# Patient Record
Sex: FEMALE | URBAN - METROPOLITAN AREA
[De-identification: names, ages, dates, MRNs, and addresses within clinical notes are randomized per-mention and may not be internally consistent; named-entity substitution may affect disease eponyms.]

---

## 2024-09-11 ENCOUNTER — OFFICE VISIT (OUTPATIENT)
Dept: FAMILY MEDICINE CLINIC | Facility: CLINIC | Age: 24
End: 2024-09-11

## 2024-09-11 VITALS
SYSTOLIC BLOOD PRESSURE: 116 MMHG | DIASTOLIC BLOOD PRESSURE: 75 MMHG | HEIGHT: 63 IN | BODY MASS INDEX: 24.8 KG/M2 | HEART RATE: 89 BPM | WEIGHT: 140 LBS

## 2024-09-11 DIAGNOSIS — Z23 ENCOUNTER FOR ADMINISTRATION OF VACCINE: ICD-10-CM

## 2024-09-11 DIAGNOSIS — Z12.4 SCREENING FOR MALIGNANT NEOPLASM OF CERVIX: ICD-10-CM

## 2024-09-11 DIAGNOSIS — Z30.09 COUNSELING FOR BIRTH CONTROL REGARDING INTRAUTERINE DEVICE (IUD): ICD-10-CM

## 2024-09-11 DIAGNOSIS — Z00.00 WELL ADULT EXAM: Primary | ICD-10-CM

## 2024-09-11 DIAGNOSIS — Z11.3 SCREENING EXAMINATION FOR STD (SEXUALLY TRANSMITTED DISEASE): ICD-10-CM

## 2024-09-11 PROCEDURE — 99385 PREV VISIT NEW AGE 18-39: CPT | Performed by: NURSE PRACTITIONER

## 2024-09-11 PROCEDURE — 90651 9VHPV VACCINE 2/3 DOSE IM: CPT | Performed by: NURSE PRACTITIONER

## 2024-09-11 PROCEDURE — 3074F SYST BP LT 130 MM HG: CPT | Performed by: NURSE PRACTITIONER

## 2024-09-11 PROCEDURE — 90471 IMMUNIZATION ADMIN: CPT | Performed by: NURSE PRACTITIONER

## 2024-09-11 PROCEDURE — 3078F DIAST BP <80 MM HG: CPT | Performed by: NURSE PRACTITIONER

## 2024-09-11 PROCEDURE — 3008F BODY MASS INDEX DOCD: CPT | Performed by: NURSE PRACTITIONER

## 2024-09-11 NOTE — PROGRESS NOTES
HPI    Patient presents for annual physical.  Negative for past medical history.  Patient with history of abnormal bruising while previously on OCPs.  Would like to refer to gynecology to discuss potentially having an IUD placed.  Does have some scattered varicose veins that are small to right upper thigh.  Otherwise, no history of abnormal bleeding.    Gyne history - G0.  LMP - 8/17/2024.  Birth control - interested in IUD.    Diet - does not eat healthy.    Exercise - does not exercise regularly.    Immunizations - gardasil today.      Review of Systems   All other systems reviewed and are negative.       Vitals:    09/11/24 1504   BP: 116/75   Pulse: 89   Weight: 140 lb (63.5 kg)   Height: 5' 3\" (1.6 m)     Body mass index is 24.8 kg/m².    Health Maintenance   Topic Date Due    HPV Vaccines (1 - 3-dose series) Never done    DTaP,Tdap,and Td Vaccines (1 - Tdap) Never done    Pap Smear  Never done    COVID-19 Vaccine (1 - 2023-24 season) Never done    Annual Depression Screening  09/11/2025 (Originally 1/1/2024)    Influenza Vaccine (1) 10/01/2024    Annual Physical  09/11/2025    Pneumococcal Vaccine: Birth to 64yrs  Aged Out       Patient's last menstrual period was 08/17/2024.    Past Medical History:    Asthma (HCC)       .  Past Surgical History:   Procedure Laterality Date    Appendectomy         Family History   Problem Relation Age of Onset    Diabetes Father        Social History     Socioeconomic History    Marital status: Single     Spouse name: Not on file    Number of children: Not on file    Years of education: Not on file    Highest education level: Not on file   Occupational History    Not on file   Tobacco Use    Smoking status: Never    Smokeless tobacco: Never   Vaping Use    Vaping status: Never Used   Substance and Sexual Activity    Alcohol use: Yes     Comment: Social    Drug use: Never    Sexual activity: Not on file   Other Topics Concern    Not on file   Social History Narrative    Not on  file     Social Determinants of Health     Financial Resource Strain: Not on file   Food Insecurity: Not on file   Transportation Needs: Not on file   Physical Activity: Not on file   Stress: Not on file   Social Connections: Not on file   Housing Stability: Not on file       No current outpatient medications on file.       Allergies:  No Known Allergies    Physical Exam  Vitals and nursing note reviewed.   Constitutional:       General: She is not in acute distress.     Appearance: Normal appearance. She is well-developed. She is not ill-appearing, toxic-appearing or diaphoretic.   HENT:      Head: Normocephalic and atraumatic.      Right Ear: Hearing, tympanic membrane, ear canal and external ear normal. There is no impacted cerumen.      Left Ear: Hearing, tympanic membrane, ear canal and external ear normal. There is no impacted cerumen.      Nose: Nose normal. No congestion.      Mouth/Throat:      Mouth: Mucous membranes are moist.      Pharynx: Oropharynx is clear. No oropharyngeal exudate or posterior oropharyngeal erythema.   Eyes:      General:         Right eye: No discharge.         Left eye: No discharge.      Conjunctiva/sclera: Conjunctivae normal.   Neck:      Thyroid: No thyromegaly.   Cardiovascular:      Rate and Rhythm: Normal rate and regular rhythm.      Pulses: Normal pulses.      Heart sounds: Normal heart sounds. No murmur heard.  Pulmonary:      Effort: Pulmonary effort is normal. No respiratory distress.      Breath sounds: Normal breath sounds. No stridor. No wheezing, rhonchi or rales.   Chest:      Chest wall: No tenderness.   Breasts:     Right: Normal.      Left: Normal.   Abdominal:      General: Abdomen is flat. Bowel sounds are normal. There is no distension.      Palpations: Abdomen is soft. There is no mass.      Tenderness: There is no abdominal tenderness. There is no guarding or rebound.      Hernia: No hernia is present.   Genitourinary:     Exam position: Lithotomy position.       Labia:         Right: No rash, tenderness, lesion or injury.         Left: No rash, tenderness, lesion or injury.    Musculoskeletal:         General: Normal range of motion.      Cervical back: Normal range of motion and neck supple.   Lymphadenopathy:      Cervical: No cervical adenopathy.   Skin:     General: Skin is warm and dry.   Neurological:      Mental Status: She is alert and oriented to person, place, and time.   Psychiatric:         Mood and Affect: Mood normal.         Behavior: Behavior normal.         Thought Content: Thought content normal.         Judgment: Judgment normal.          Assessment and Plan:  Problem List Items Addressed This Visit    None  Visit Diagnoses       Well adult exam    -  Primary    Relevant Orders    ThinPrep PAP with HPV Reflex Request B    Lipid Panel    Comp Metabolic Panel (14)    CBC With Differential With Platelet    TSH W Reflex To Free T4    GARDASIL 9 (Completed)    Chlamydia/Gc Amplification    HIV Ag/Ab Combo    T Pallidum Screening [place]    Screening for malignant neoplasm of cervix        Relevant Orders    ThinPrep PAP with HPV Reflex Request B    Encounter for administration of vaccine        Relevant Orders    GARDASIL 9 (Completed)    Counseling for birth control regarding intrauterine device (IUD)        Relevant Orders    OBG Referral - In Network    Screening examination for STD (sexually transmitted disease)        Relevant Orders    Chlamydia/Gc Amplification    HIV Ag/Ab Combo    T Pallidum Screening [facility]           Follow up for fasting labs.  Pap today.  Referral to gyne for IUD consult.       Discussed plan of care with patient and patient is in agreement.  All questions answered. Patient to call with questions or concerns.    Encouraged to sign up for My Chart if not already registered.

## 2024-09-21 ENCOUNTER — LAB ENCOUNTER (OUTPATIENT)
Dept: LAB | Age: 24
End: 2024-09-21
Attending: NURSE PRACTITIONER
Payer: COMMERCIAL

## 2024-09-21 DIAGNOSIS — Z00.00 WELL ADULT EXAM: ICD-10-CM

## 2024-09-21 DIAGNOSIS — Z11.3 SCREENING EXAMINATION FOR STD (SEXUALLY TRANSMITTED DISEASE): ICD-10-CM

## 2024-09-21 LAB
ALBUMIN SERPL-MCNC: 4.4 G/DL (ref 3.2–4.8)
ALBUMIN/GLOB SERPL: 1.6 {RATIO} (ref 1–2)
ALP LIVER SERPL-CCNC: 83 U/L
ALT SERPL-CCNC: 19 U/L
ANION GAP SERPL CALC-SCNC: 7 MMOL/L (ref 0–18)
AST SERPL-CCNC: 19 U/L (ref ?–34)
BASOPHILS # BLD AUTO: 0.04 X10(3) UL (ref 0–0.2)
BASOPHILS NFR BLD AUTO: 0.7 %
BILIRUB SERPL-MCNC: 0.7 MG/DL (ref 0.3–1.2)
BUN BLD-MCNC: 10 MG/DL (ref 9–23)
BUN/CREAT SERPL: 16.1 (ref 10–20)
CALCIUM BLD-MCNC: 9.4 MG/DL (ref 8.7–10.4)
CHLORIDE SERPL-SCNC: 109 MMOL/L (ref 98–112)
CHOLEST SERPL-MCNC: 173 MG/DL (ref ?–200)
CO2 SERPL-SCNC: 24 MMOL/L (ref 21–32)
CREAT BLD-MCNC: 0.62 MG/DL
DEPRECATED RDW RBC AUTO: 40.9 FL (ref 35.1–46.3)
EGFRCR SERPLBLD CKD-EPI 2021: 128 ML/MIN/1.73M2 (ref 60–?)
EOSINOPHIL # BLD AUTO: 0.06 X10(3) UL (ref 0–0.7)
EOSINOPHIL NFR BLD AUTO: 1 %
ERYTHROCYTE [DISTWIDTH] IN BLOOD BY AUTOMATED COUNT: 12.2 % (ref 11–15)
FASTING PATIENT LIPID ANSWER: YES
FASTING STATUS PATIENT QL REPORTED: YES
GLOBULIN PLAS-MCNC: 2.8 G/DL (ref 2–3.5)
GLUCOSE BLD-MCNC: 76 MG/DL (ref 70–99)
HCT VFR BLD AUTO: 42.2 %
HDLC SERPL-MCNC: 59 MG/DL (ref 40–59)
HGB BLD-MCNC: 14.2 G/DL
IMM GRANULOCYTES # BLD AUTO: 0.01 X10(3) UL (ref 0–1)
IMM GRANULOCYTES NFR BLD: 0.2 %
LDLC SERPL CALC-MCNC: 104 MG/DL (ref ?–100)
LYMPHOCYTES # BLD AUTO: 2.21 X10(3) UL (ref 1–4)
LYMPHOCYTES NFR BLD AUTO: 36.8 %
MCH RBC QN AUTO: 30.8 PG (ref 26–34)
MCHC RBC AUTO-ENTMCNC: 33.6 G/DL (ref 31–37)
MCV RBC AUTO: 91.5 FL
MONOCYTES # BLD AUTO: 0.54 X10(3) UL (ref 0.1–1)
MONOCYTES NFR BLD AUTO: 9 %
NEUTROPHILS # BLD AUTO: 3.14 X10 (3) UL (ref 1.5–7.7)
NEUTROPHILS # BLD AUTO: 3.14 X10(3) UL (ref 1.5–7.7)
NEUTROPHILS NFR BLD AUTO: 52.3 %
NONHDLC SERPL-MCNC: 114 MG/DL (ref ?–130)
OSMOLALITY SERPL CALC.SUM OF ELEC: 288 MOSM/KG (ref 275–295)
PLATELET # BLD AUTO: 341 10(3)UL (ref 150–450)
POTASSIUM SERPL-SCNC: 4.3 MMOL/L (ref 3.5–5.1)
PROT SERPL-MCNC: 7.2 G/DL (ref 5.7–8.2)
RBC # BLD AUTO: 4.61 X10(6)UL
SODIUM SERPL-SCNC: 140 MMOL/L (ref 136–145)
T PALLIDUM AB SER QL IA: NONREACTIVE
TRIGL SERPL-MCNC: 49 MG/DL (ref 30–149)
TSI SER-ACNC: 1.57 MIU/ML (ref 0.55–4.78)
VLDLC SERPL CALC-MCNC: 8 MG/DL (ref 0–30)
WBC # BLD AUTO: 6 X10(3) UL (ref 4–11)

## 2024-09-21 PROCEDURE — 87591 N.GONORRHOEAE DNA AMP PROB: CPT

## 2024-09-21 PROCEDURE — 84443 ASSAY THYROID STIM HORMONE: CPT

## 2024-09-21 PROCEDURE — 80061 LIPID PANEL: CPT

## 2024-09-21 PROCEDURE — 85025 COMPLETE CBC W/AUTO DIFF WBC: CPT

## 2024-09-21 PROCEDURE — 36415 COLL VENOUS BLD VENIPUNCTURE: CPT

## 2024-09-21 PROCEDURE — 86780 TREPONEMA PALLIDUM: CPT

## 2024-09-21 PROCEDURE — 87389 HIV-1 AG W/HIV-1&-2 AB AG IA: CPT

## 2024-09-21 PROCEDURE — 87491 CHLMYD TRACH DNA AMP PROBE: CPT

## 2024-09-21 PROCEDURE — 80053 COMPREHEN METABOLIC PANEL: CPT

## 2024-09-23 LAB
C TRACH DNA SPEC QL NAA+PROBE: NEGATIVE
N GONORRHOEA DNA SPEC QL NAA+PROBE: NEGATIVE

## 2024-09-24 ENCOUNTER — OFFICE VISIT (OUTPATIENT)
Dept: OBGYN CLINIC | Facility: CLINIC | Age: 24
End: 2024-09-24

## 2024-09-24 VITALS
WEIGHT: 141 LBS | DIASTOLIC BLOOD PRESSURE: 76 MMHG | SYSTOLIC BLOOD PRESSURE: 121 MMHG | BODY MASS INDEX: 25 KG/M2 | HEART RATE: 80 BPM

## 2024-09-24 DIAGNOSIS — Z30.09 COUNSELING FOR BIRTH CONTROL REGARDING INTRAUTERINE DEVICE (IUD): Primary | ICD-10-CM

## 2024-09-24 PROCEDURE — 3074F SYST BP LT 130 MM HG: CPT | Performed by: STUDENT IN AN ORGANIZED HEALTH CARE EDUCATION/TRAINING PROGRAM

## 2024-09-24 PROCEDURE — 3078F DIAST BP <80 MM HG: CPT | Performed by: STUDENT IN AN ORGANIZED HEALTH CARE EDUCATION/TRAINING PROGRAM

## 2024-09-24 PROCEDURE — 99213 OFFICE O/P EST LOW 20 MIN: CPT | Performed by: STUDENT IN AN ORGANIZED HEALTH CARE EDUCATION/TRAINING PROGRAM

## 2024-09-24 NOTE — PROGRESS NOTES
[facility]  Obstetrics and Gynecology  Gyne Problem Visit      Olga Lidia Hameed is a 24 year old female  is a new patient to me presenting to discuss birth control options. States that she has tried OCPs in the past but had to discontinue due to abnormal bruising that she noted after taking medication. She had an IUD inserted in the past which was removed about a month later due to irregular bleeding and pelvic cramping. Patient's last menstrual period was 2024 (exact date). Cycles are regular with menses lasting about 3 days with heavy flow. She is sexually active and uses condoms for contraception. She is up to date on cervical cancer screening.    Pap: 2024  Contraception:condoms    OBSTETRICS HISTORY:  OB History    Para Term  AB Living   0 0 0 0 0 0   SAB IAB Ectopic Multiple Live Births   0 0 0 0 0       GYNE HISTORY:  Hx Prior Abnormal Pap: No   Menarche: 10-12y/o (2024  2:15 PM)  Period Cycle (Days): 28 days (2024  2:15 PM)  Period Duration (Days): 3 days (2024  2:15 PM)  Period Flow: Heavy (2024  2:15 PM)  Use of Birth Control (if yes, specify type): Condoms (2024  2:15 PM)  Date When Birth Control Last Used: 2024 (2024  2:15 PM)  Hx Prior Abnormal Pap: No (2024  2:15 PM)        Latest Ref Rng & Units 2024     3:42 PM   RECENT PAP RESULTS   Thinprep Pap Negative for intraepithelial lesion or malignancy Negative for intraepithelial lesion or malignancy          History   Sexual Activity    Sexual activity: Not on file       MEDICAL HISTORY:  Past Medical History:   Diagnosis Date    Asthma (HCC)      Past Surgical History:   Procedure Laterality Date    Appendectomy         SOCIAL HISTORY:  Social History     Socioeconomic History    Marital status: Single     Spouse name: Not on file    Number of children: Not on file    Years of education: Not on file    Highest education level: Not on file   Occupational History    Not on file    Tobacco Use    Smoking status: Never    Smokeless tobacco: Never   Vaping Use    Vaping status: Never Used   Substance and Sexual Activity    Alcohol use: Yes     Comment: Social    Drug use: Never    Sexual activity: Not on file   Other Topics Concern    Not on file   Social History Narrative    Not on file     Social Determinants of Health     Financial Resource Strain: Not on file   Food Insecurity: Not on file   Transportation Needs: Not on file   Physical Activity: Not on file   Stress: Not on file   Social Connections: Not on file   Housing Stability: Not on file       MEDICATIONS:  No current outpatient medications on file.    ALLERGIES:  No Known Allergies      REVIEW OF SYSTEMS:  Review of Systems   Constitutional:  Negative for chills, fever and unexpected weight change.   Respiratory: Negative.     Cardiovascular: Negative.    Gastrointestinal:  Negative for abdominal pain, constipation, diarrhea and nausea.   Genitourinary:  Negative for dyspareunia, dysuria, genital sores, hematuria, menstrual problem, pelvic pain, vaginal bleeding, vaginal discharge and vaginal pain.   Musculoskeletal: Negative.    Skin: Negative.    Neurological: Negative.    Hematological: Negative.    Psychiatric/Behavioral: Negative.         PHYSICAL EXAM:  /76 (BP Location: Right arm, Patient Position: Sitting, Cuff Size: adult)   Pulse 80   Wt 141 lb (64 kg)   LMP 09/17/2024 (Exact Date)   BMI 24.98 kg/m²     GENERAL: well developed, well nourished, in no apparent distress  ABDOMEN: Soft, non distended; non tender, no masses     ASSESSMENT:       ICD-10-CM    1. Counseling for birth control regarding intrauterine device (IUD)  Z30.09           Plan:  We reviewed the 2 types of IUD's, progesterone containing IUD's and a copper-containing IUD.  I discussed the menstrual side effects of progesterone IUD's including probability of shorter, lighter periods and possibly amenorrhea.  We also discussed risk of having  prolonged, light periods as a side effect but that these usually resolve with time.  We discussed that it is possible to have progesterone related side effects including weight changes, mood changes, skin changes and headaches.  I also discussed the possibility of cramping with menses.  I counseled patient that the Chanda, Kyleena, and Mirena IUDs can be used for 3, 5, and 8 years respectively. They may be removed at any time before the approved years if patient desires pregnancy or has side effects.  I counseled patient on the ParaGard/copper IUD.  We discussed that the side effects may include heavier bleeding and increasing cramping.  We discussed that because there is no hormones in the ParaGard IUD that they will have no hormonal side effects.  I counseled patient that the ParaGard IUD is good for up to 10 years but may be removed before that at any time if patient desires pregnancy or has side effects.  I counseled patient on risks of IUD insertion including infection, PID, fertility issues and uterine perforation with possible complications.  I discussed side effects of insertion including spotting/bleeding or cramping for a few hours to a few days.  - Patient will schedule insertion during her next menses    RALPH PERRY PA-C  2:22 PM  9/24/2024    This is a 20 minute visit and greater than 50% of the time was spent counseling the patient and/or coordinating care.

## 2024-09-24 NOTE — PROGRESS NOTES
Full Name & , verify w/ pt  Informed of below msg, pt understood and had no further questions    El colesterol LDL \"ada\" está ligeramente por encima del rango normal.  Debe asegurarse de comer yanet y hacer ejercicio con regularidad.  El sagar de delia laboratorios son normales y todos delia exámenes de STD son negativos.  Es posible que realice un seguimiento en 1 año para repetir el examen físico con los laboratorios.

## 2024-10-15 ENCOUNTER — TELEPHONE (OUTPATIENT)
Dept: OBGYN CLINIC | Facility: CLINIC | Age: 24
End: 2024-10-15

## 2024-10-15 NOTE — TELEPHONE ENCOUNTER
Patient called in to request a nurse to call to schedule IUD placement appointment.  Patient states today is the 1st day of her period.  Emirati interpretor needed

## 2024-10-15 NOTE — TELEPHONE ENCOUNTER
Patient verified name and     Patient scheduled for appointment 10/17 with Kathia. Voiced understanding and agreed.

## 2024-10-17 ENCOUNTER — TELEPHONE (OUTPATIENT)
Dept: OBGYN CLINIC | Facility: CLINIC | Age: 24
End: 2024-10-17

## 2024-10-17 ENCOUNTER — OFFICE VISIT (OUTPATIENT)
Dept: OBGYN CLINIC | Facility: CLINIC | Age: 24
End: 2024-10-17

## 2024-10-17 VITALS
DIASTOLIC BLOOD PRESSURE: 79 MMHG | WEIGHT: 144.19 LBS | SYSTOLIC BLOOD PRESSURE: 120 MMHG | HEIGHT: 63 IN | BODY MASS INDEX: 25.55 KG/M2

## 2024-10-17 DIAGNOSIS — Z32.00 PREGNANCY EXAMINATION OR TEST, PREGNANCY UNCONFIRMED: Primary | ICD-10-CM

## 2024-10-17 LAB
CONTROL LINE PRESENT WITH A CLEAR BACKGROUND (YES/NO): YES YES/NO
KIT LOT #: NORMAL NUMERIC
PREGNANCY TEST, URINE: NEGATIVE

## 2024-10-17 PROCEDURE — 3078F DIAST BP <80 MM HG: CPT | Performed by: STUDENT IN AN ORGANIZED HEALTH CARE EDUCATION/TRAINING PROGRAM

## 2024-10-17 PROCEDURE — 3074F SYST BP LT 130 MM HG: CPT | Performed by: STUDENT IN AN ORGANIZED HEALTH CARE EDUCATION/TRAINING PROGRAM

## 2024-10-17 PROCEDURE — 3008F BODY MASS INDEX DOCD: CPT | Performed by: STUDENT IN AN ORGANIZED HEALTH CARE EDUCATION/TRAINING PROGRAM

## 2024-10-17 PROCEDURE — 81025 URINE PREGNANCY TEST: CPT | Performed by: STUDENT IN AN ORGANIZED HEALTH CARE EDUCATION/TRAINING PROGRAM

## 2024-10-17 PROCEDURE — 99212 OFFICE O/P EST SF 10 MIN: CPT | Performed by: STUDENT IN AN ORGANIZED HEALTH CARE EDUCATION/TRAINING PROGRAM

## 2024-10-17 RX ORDER — MISOPROSTOL 200 UG/1
TABLET ORAL
Qty: 2 TABLET | Refills: 0 | Status: SHIPPED | OUTPATIENT
Start: 2024-10-17

## 2024-10-17 NOTE — PROGRESS NOTES
[facility]  Obstetrics and Gynecology  IUD Insertion Procedure Note  Kathia Perry PA-C    Olga Lidia Hameed is a 24 year old female presenting for IUD  Insertion.     IUD Insertion:     Pregnancy Results: negative from urine test   Birth control method(s) used:  Condoms  Pt's LMP was Patient's last menstrual period was 09/17/2024 (exact date).    Pt counseled on risks of IUD insertion including infection, PID/infertility, bleeding and uterine perforation.  Discussed side effects of Chanda IUD including cramping and bleeding.  Pt understands and has signed consent.    Pelvic Exam Findings:  Cervix normal. Uterus anteverted.     Procedure:  Speculum placed in the vagina.  Betadine wash of vagina and cervix.  Single tooth tenaculum was placed at the 12 o'clock position.  Difficulty sounding uterus with sound and dilator.     Visit Plan:  Patient to return after taking cytotec 24 and 12 hours prior to procedure.    KATHIA PERRY PA-C  8:29 AM  10/17/2024

## 2024-10-17 NOTE — TELEPHONE ENCOUNTER
Current Outpatient Medications   Medication Sig Dispense Refill    miSOPROStol (CYTOTEC) 200 MCG Oral Tab Take 1 tab 24 hours and 1 tab 12 hours prior to appointment. 2 tablet 0      Depending on procedure   do uyou want vaginal or pills

## 2024-10-25 ENCOUNTER — OFFICE VISIT (OUTPATIENT)
Dept: OBGYN CLINIC | Facility: CLINIC | Age: 24
End: 2024-10-25

## 2024-10-25 VITALS
BODY MASS INDEX: 25.52 KG/M2 | DIASTOLIC BLOOD PRESSURE: 77 MMHG | SYSTOLIC BLOOD PRESSURE: 116 MMHG | HEIGHT: 63 IN | WEIGHT: 144 LBS

## 2024-10-25 DIAGNOSIS — Z30.011 ENCOUNTER FOR BCP (BIRTH CONTROL PILLS) INITIAL PRESCRIPTION: Primary | ICD-10-CM

## 2024-10-25 PROCEDURE — 99212 OFFICE O/P EST SF 10 MIN: CPT | Performed by: STUDENT IN AN ORGANIZED HEALTH CARE EDUCATION/TRAINING PROGRAM

## 2024-10-25 PROCEDURE — 3078F DIAST BP <80 MM HG: CPT | Performed by: STUDENT IN AN ORGANIZED HEALTH CARE EDUCATION/TRAINING PROGRAM

## 2024-10-25 PROCEDURE — 3008F BODY MASS INDEX DOCD: CPT | Performed by: STUDENT IN AN ORGANIZED HEALTH CARE EDUCATION/TRAINING PROGRAM

## 2024-10-25 PROCEDURE — 3074F SYST BP LT 130 MM HG: CPT | Performed by: STUDENT IN AN ORGANIZED HEALTH CARE EDUCATION/TRAINING PROGRAM

## 2024-10-25 RX ORDER — ACETAMINOPHEN AND CODEINE PHOSPHATE 120; 12 MG/5ML; MG/5ML
0.35 SOLUTION ORAL DAILY
Qty: 28 TABLET | Refills: 12 | Status: SHIPPED | OUTPATIENT
Start: 2024-10-25 | End: 2025-10-25

## 2024-10-25 NOTE — PROGRESS NOTES
[facility]  Obstetrics and Gynecology  IUD Insertion Procedure Note  Kathia Perry PA-C    Olga Lidia Hameed is a 24 year old female presenting for IUD Removal and Insertion.     IUD Insertion:     Pregnancy Results: negative from urine test   Birth control method(s) used:  None  Pt's LMP was Patient's last menstrual period was 09/17/2024 (exact date).    Pt counseled on risks of IUD insertion including infection, PID/infertility, bleeding and uterine perforation.  Discussed side effects of Chanda IUD including cramping and bleeding.  Pt understands and has signed consent.    Pelvic Exam Findings:  Cervix normal. Uterus anteverted.     Procedure:  Speculum placed in the vagina.  Betadine wash of vagina and cervix.  Single tooth tenaculum was placed at the 12 o'clock position.  Unable to sound uterus. Attempted multiple times with dilator.  Unable to insert IUD    Offered to schedule patient with a different provider but patient declined. She is interested in starting POPs. Pt started on Terri. Counseled on how to start medication, what to do if she misses a day, and potential side effects. Advised pt that OCP takes about 30 days to become effective, thus encouraged condom use. Discussed how OCPs do not prevent HPV or other STIs. Pt verbalized understanding.       KATHIA PERRY PA-C  8:28 AM  10/25/2024

## 2024-11-06 ENCOUNTER — OFFICE VISIT (OUTPATIENT)
Dept: FAMILY MEDICINE CLINIC | Facility: CLINIC | Age: 24
End: 2024-11-06

## 2024-11-06 VITALS
WEIGHT: 143.81 LBS | SYSTOLIC BLOOD PRESSURE: 99 MMHG | HEART RATE: 79 BPM | BODY MASS INDEX: 25.48 KG/M2 | DIASTOLIC BLOOD PRESSURE: 62 MMHG | HEIGHT: 63 IN

## 2024-11-06 DIAGNOSIS — K64.4 EXTERNAL HEMORRHOID: Primary | ICD-10-CM

## 2024-11-06 DIAGNOSIS — K64.8 INTERNAL HEMORRHOID: ICD-10-CM

## 2024-11-06 DIAGNOSIS — K59.01 SLOW TRANSIT CONSTIPATION: ICD-10-CM

## 2024-11-06 DIAGNOSIS — Z23 ENCOUNTER FOR ADMINISTRATION OF VACCINE: ICD-10-CM

## 2024-11-06 PROCEDURE — 90471 IMMUNIZATION ADMIN: CPT | Performed by: NURSE PRACTITIONER

## 2024-11-06 PROCEDURE — 90651 9VHPV VACCINE 2/3 DOSE IM: CPT | Performed by: NURSE PRACTITIONER

## 2024-11-06 PROCEDURE — 3078F DIAST BP <80 MM HG: CPT | Performed by: NURSE PRACTITIONER

## 2024-11-06 PROCEDURE — 99214 OFFICE O/P EST MOD 30 MIN: CPT | Performed by: NURSE PRACTITIONER

## 2024-11-06 PROCEDURE — 3074F SYST BP LT 130 MM HG: CPT | Performed by: NURSE PRACTITIONER

## 2024-11-06 PROCEDURE — 3008F BODY MASS INDEX DOCD: CPT | Performed by: NURSE PRACTITIONER

## 2024-11-06 RX ORDER — HYDROCORTISONE 25 MG/G
1 CREAM TOPICAL 2 TIMES DAILY PRN
Qty: 28 G | Refills: 1 | Status: SHIPPED | OUTPATIENT
Start: 2024-11-06

## 2024-11-06 RX ORDER — HYDROCORTISONE ACETATE 25 MG/1
25 SUPPOSITORY RECTAL 2 TIMES DAILY PRN
Qty: 7 SUPPOSITORY | Refills: 1 | Status: SHIPPED | OUTPATIENT
Start: 2024-11-06

## 2024-11-06 NOTE — PROGRESS NOTES
HPI    Patient presents for concerns of blood when having bowel movements for the past 3 days.  With a history of hemorrhoids.  Does report having some intermittent constipation.  Would like her second HPV vaccine while in the office today, as well.    Review of Systems   Gastrointestinal:  Positive for blood in stool and constipation.        Vitals:    11/06/24 0744   BP: 99/62   Pulse: 79   Weight: 143 lb 12.8 oz (65.2 kg)   Height: 5' 3\" (1.6 m)     Body mass index is 25.47 kg/m².    Health Maintenance   Topic Date Due    DTaP,Tdap,and Td Vaccines (1 - Tdap) Never done    COVID-19 Vaccine (1 - 2023-24 season) Never done    Influenza Vaccine (1) Never done    HPV Vaccines (2 - 3-dose series) 10/09/2024    Annual Physical  09/11/2025    Chlamydia Screening  09/21/2025    Pap Smear  09/11/2027    Annual Depression Screening  Completed    Pneumococcal Vaccine: Birth to 64yrs  Aged Out       Patient's last menstrual period was 10/17/2024 (exact date).    Past Medical History:    Asthma (HCC)       .  Past Surgical History:   Procedure Laterality Date    Appendectomy         Family History   Problem Relation Age of Onset    Diabetes Father        Social History     Socioeconomic History    Marital status: Single     Spouse name: Not on file    Number of children: Not on file    Years of education: Not on file    Highest education level: Not on file   Occupational History    Not on file   Tobacco Use    Smoking status: Never    Smokeless tobacco: Never   Vaping Use    Vaping status: Never Used   Substance and Sexual Activity    Alcohol use: Yes     Comment: Social    Drug use: Never    Sexual activity: Not on file   Other Topics Concern    Not on file   Social History Narrative    Not on file     Social Drivers of Health     Financial Resource Strain: Not on file   Food Insecurity: Not on file   Transportation Needs: Not on file   Physical Activity: Not on file   Stress: Not on file   Social Connections: Not on file    Housing Stability: Not on file       Current Outpatient Medications   Medication Sig Dispense Refill    hydrocortisone (ANUSOL-HC) 25 MG Rectal Suppos Place 1 suppository (25 mg total) rectally 2 (two) times daily as needed for Hemorrhoids. 7 suppository 1    hydrocortisone 2.5 % External Cream Place 1 Application rectally 2 (two) times daily as needed for Hemorrhoids. 28 g 1       Allergies:  Allergies[1]    Physical Exam  Vitals and nursing note reviewed.   Constitutional:       Appearance: Normal appearance.   Pulmonary:      Effort: No respiratory distress.   Genitourinary:     Rectum: External hemorrhoid and internal hemorrhoid present.   Neurological:      Mental Status: She is alert and oriented to person, place, and time.          Assessment and Plan:  Problem List Items Addressed This Visit    None  Visit Diagnoses       External hemorrhoid    -  Primary    Relevant Medications    hydrocortisone (ANUSOL-HC) 25 MG Rectal Suppos    hydrocortisone 2.5 % External Cream    Slow transit constipation        Relevant Medications    hydrocortisone (ANUSOL-HC) 25 MG Rectal Suppos    hydrocortisone 2.5 % External Cream    Internal hemorrhoid        Relevant Medications    hydrocortisone (ANUSOL-HC) 25 MG Rectal Suppos    hydrocortisone 2.5 % External Cream    Encounter for administration of vaccine        Relevant Orders    GARDASIL 9           Anusol cream and suppositories twice daily as needed.  Should increase water intake and fiber intake in diet.  Supportive care discussed with patient.  If no relief of symptoms, follow-up alternative treatment options.  Gardasil vaccine administered to patient in the office today.     Discussed plan of care with patient and patient is in agreement.  All questions answered. Patient to call with questions or concerns.    Encouraged to sign up for My Chart if not already registered.        [1] No Known Allergies

## 2025-01-09 ENCOUNTER — OFFICE VISIT (OUTPATIENT)
Dept: FAMILY MEDICINE CLINIC | Facility: CLINIC | Age: 25
End: 2025-01-09

## 2025-01-09 VITALS
BODY MASS INDEX: 26.43 KG/M2 | SYSTOLIC BLOOD PRESSURE: 109 MMHG | HEART RATE: 85 BPM | HEIGHT: 63 IN | WEIGHT: 149.19 LBS | DIASTOLIC BLOOD PRESSURE: 67 MMHG

## 2025-01-09 DIAGNOSIS — N64.4 BREAST PAIN: ICD-10-CM

## 2025-01-09 DIAGNOSIS — N94.10 DYSPAREUNIA IN FEMALE: Primary | ICD-10-CM

## 2025-01-09 DIAGNOSIS — R10.2 PELVIC PAIN: ICD-10-CM

## 2025-01-09 PROCEDURE — 3008F BODY MASS INDEX DOCD: CPT | Performed by: NURSE PRACTITIONER

## 2025-01-09 PROCEDURE — 99214 OFFICE O/P EST MOD 30 MIN: CPT | Performed by: NURSE PRACTITIONER

## 2025-01-09 PROCEDURE — 3074F SYST BP LT 130 MM HG: CPT | Performed by: NURSE PRACTITIONER

## 2025-01-09 PROCEDURE — 3078F DIAST BP <80 MM HG: CPT | Performed by: NURSE PRACTITIONER

## 2025-01-09 NOTE — PROGRESS NOTES
HPI    Patient presents for concerns of pain during intercourse.  Also with concerns of intermittent pain in breasts.  Had an episode of pain that was unbearable on 12/10.  Unsure if it was pain from period.  Unsure of chance of pregnancy.  Last menstrual period 12/17.      Review of Systems   Genitourinary:  Positive for dyspareunia and pelvic pain.        Intermittent breast pain.   All other systems reviewed and are negative.       Vitals:    01/09/25 1722   BP: 109/67   Pulse: 85   Weight: 149 lb 3.2 oz (67.7 kg)   Height: 5' 3\" (1.6 m)     Body mass index is 26.43 kg/m².    Health Maintenance   Topic Date Due    Meningococcal B Vaccine (1 of 2 - Standard) Never done    DTaP,Tdap,and Td Vaccines (1 - Tdap) Never done    COVID-19 Vaccine (1 - 2024-25 season) Never done    Influenza Vaccine (1) Never done    Annual Depression Screening  01/01/2025    HPV Vaccines (3 - 3-dose series) 03/11/2025    Annual Physical  09/11/2025    Chlamydia Screening  09/21/2025    Pap Smear  09/11/2027    Pneumococcal Vaccine: Birth to 50yrs  Aged Out       Past Medical History:    Asthma (HCC)       .  Past Surgical History:   Procedure Laterality Date    Appendectomy         Family History   Problem Relation Age of Onset    Diabetes Father        Social History     Socioeconomic History    Marital status: Single     Spouse name: Not on file    Number of children: Not on file    Years of education: Not on file    Highest education level: Not on file   Occupational History    Not on file   Tobacco Use    Smoking status: Never    Smokeless tobacco: Never   Vaping Use    Vaping status: Never Used   Substance and Sexual Activity    Alcohol use: Yes     Comment: Social    Drug use: Never    Sexual activity: Not on file   Other Topics Concern    Not on file   Social History Narrative    Not on file     Social Drivers of Health     Financial Resource Strain: Not on file   Food Insecurity: Not on file   Transportation Needs: Not on file    Physical Activity: Not on file   Stress: Not on file   Social Connections: Not on file   Housing Stability: Not on file       No current outpatient medications on file.       Allergies:  Allergies[1]    Physical Exam  Vitals and nursing note reviewed.   Constitutional:       General: She is not in acute distress.     Appearance: Normal appearance.   Cardiovascular:      Rate and Rhythm: Normal rate and regular rhythm.      Heart sounds: Normal heart sounds.   Pulmonary:      Effort: Pulmonary effort is normal. No respiratory distress.      Breath sounds: Normal breath sounds. No stridor. No wheezing, rhonchi or rales.   Chest:      Chest wall: No tenderness.   Breasts:     Right: Normal.      Left: Normal.   Neurological:      Mental Status: She is alert.          Assessment and Plan:   Problem List Items Addressed This Visit    None  Visit Diagnoses       Dyspareunia in female    -  Primary    Relevant Orders    US PELVIS (TRANSABDOMINAL AND TRANSVAGINAL) (CPT=76856/12423)    Pelvic pain        Relevant Orders    US PELVIS (TRANSABDOMINAL AND TRANSVAGINAL) (CPT=76856/83870)    Breast pain        Relevant Orders    US BREAST BILATERAL COMPLETE (CPT=76641-50)           Follow-up for pelvic ultrasound as well as breast ultrasound.    Discussed plan of care with patient and patient is in agreement.  All questions answered. Patient to call with questions or concerns.    Encouraged to sign up for My Chart if not already registered.        [1] No Known Allergies

## 2025-02-10 ENCOUNTER — HOSPITAL ENCOUNTER (OUTPATIENT)
Dept: ULTRASOUND IMAGING | Facility: HOSPITAL | Age: 25
Discharge: HOME | End: 2025-02-10
Attending: NURSE PRACTITIONER
Payer: COMMERCIAL

## 2025-02-10 DIAGNOSIS — N64.4 BREAST PAIN: ICD-10-CM

## 2025-02-10 PROCEDURE — 76642 ULTRASOUND BREAST LIMITED: CPT | Performed by: NURSE PRACTITIONER

## 2025-02-11 DIAGNOSIS — N63.10 MASS OF RIGHT BREAST, UNSPECIFIED QUADRANT: Primary | ICD-10-CM

## 2025-02-13 ENCOUNTER — HOSPITAL ENCOUNTER (OUTPATIENT)
Dept: ULTRASOUND IMAGING | Age: 25
Discharge: HOME | End: 2025-02-13
Attending: NURSE PRACTITIONER
Payer: COMMERCIAL

## 2025-02-13 DIAGNOSIS — N94.10 DYSPAREUNIA IN FEMALE: ICD-10-CM

## 2025-02-13 DIAGNOSIS — R10.2 PELVIC PAIN: ICD-10-CM

## 2025-02-13 PROCEDURE — 76856 US EXAM PELVIC COMPLETE: CPT | Performed by: NURSE PRACTITIONER

## 2025-02-13 PROCEDURE — 76830 TRANSVAGINAL US NON-OB: CPT | Performed by: NURSE PRACTITIONER

## 2025-04-01 ENCOUNTER — NURSE ONLY (OUTPATIENT)
Dept: FAMILY MEDICINE CLINIC | Facility: CLINIC | Age: 25
End: 2025-04-01

## 2025-04-01 DIAGNOSIS — Z23 NEED FOR VACCINATION: Primary | ICD-10-CM

## 2025-04-01 PROCEDURE — 90471 IMMUNIZATION ADMIN: CPT | Performed by: NURSE PRACTITIONER

## 2025-04-01 PROCEDURE — 90651 9VHPV VACCINE 2/3 DOSE IM: CPT | Performed by: NURSE PRACTITIONER

## 2025-04-01 NOTE — PROGRESS NOTES
Patient is here for the third dose of the HPV vaccine. Verified full name and . Per vaccine series continuation protocol - no signature required for subsequent vaccine of HPV series. Provided patient with vaccine information statement. Patient tolerated vaccine well, no reactions noted at this time.

## 2025-06-10 ENCOUNTER — TELEMEDICINE (OUTPATIENT)
Dept: FAMILY MEDICINE CLINIC | Facility: CLINIC | Age: 25
End: 2025-06-10
Payer: COMMERCIAL

## 2025-06-10 DIAGNOSIS — B35.3 TINEA PEDIS OF LEFT FOOT: Primary | ICD-10-CM

## 2025-06-10 PROCEDURE — 98004 SYNCH AUDIO-VIDEO EST SF 10: CPT | Performed by: NURSE PRACTITIONER

## 2025-06-10 RX ORDER — CLOTRIMAZOLE AND BETAMETHASONE DIPROPIONATE 10; .64 MG/G; MG/G
1 CREAM TOPICAL 2 TIMES DAILY
Qty: 60 G | Refills: 3 | Status: SHIPPED | OUTPATIENT
Start: 2025-06-10

## 2025-06-11 NOTE — PROGRESS NOTES
\[facility]\""    Virtual Telephone/Video Check-In    Olga Lidia Hameed verbally consents to a Virtual/Telephone Check-In visit on 06/10/25.  Patient has been referred to the [facility] website at [email or web address] to review the yearly Consent to Treat document.    Patient understands and accepts financial responsibility for any deductible, co-insurance and/or co-pays associated with this service.    Duration of the service: 10 minutes      Summary of topics discussed:     Patient presents for concerns of left toe redness and irritation.  Is peeling and causing discomfort.  Patient has tried over-the-counter creams without relief of symptoms.    Review of Systems   Skin:         Left toe irritation.     All other systems reviewed and are negative.       There were no vitals filed for this visit.  There is no height or weight on file to calculate BMI.    Health Maintenance   Topic Date Due    Meningococcal B Vaccine (1 of 2 - Standard) Never done    DTaP,Tdap,and Td Vaccines (1 - Tdap) Never done    COVID-19 Vaccine (1 - 2024-25 season) Never done    Annual Physical  09/11/2025    Chlamydia Screening  09/21/2025    Influenza Vaccine (Season Ended) 10/01/2025    Pap Smear  09/11/2027    Annual Depression Screening  Completed    HPV Vaccines  Completed    Pneumococcal Vaccine: Birth to 50yrs  Aged Out         Past Medical History[1]    .Past Surgical History[2]    Family History[3]    Social History     Socioeconomic History    Marital status: Single     Spouse name: Not on file    Number of children: Not on file    Years of education: Not on file    Highest education level: Not on file   Occupational History    Not on file   Tobacco Use    Smoking status: Never    Smokeless tobacco: Never   Vaping Use    Vaping status: Never Used   Substance and Sexual Activity    Alcohol use: Yes     Comment: Social    Drug use: Never    Sexual activity: Not on file   Other Topics Concern    Not on file   Social History Narrative    Not  on file     Social Drivers of Health     Food Insecurity: Not on file   Transportation Needs: Not on file   Stress: Not on file   Housing Stability: Not on file       Current Medications[4]    Allergies:  Allergies[5]    Physical Exam  Constitutional:       Appearance: Normal appearance.   Pulmonary:      Effort: No respiratory distress.   Neurological:      Mental Status: She is alert and oriented to person, place, and time.          Assessment and Plan:   Problem List Items Addressed This Visit    None  Visit Diagnoses         Tinea pedis of left foot    -  Primary    Relevant Medications    clotrimazole-betamethasone 1-0.05 % External Cream           Clotrimazole/betamethasone cream twice daily.  Supportive care discussed.  Follow-up as needed if no relief of symptoms.    Discussed plan of care with patient and patient is in agreement.  All questions answered. Patient to call with questions or concerns.    Encouraged to sign up for My Chart if not already registered.          [1]   Past Medical History:   Asthma (HCC)   [2]   Past Surgical History:  Procedure Laterality Date    Appendectomy     [3]   Family History  Problem Relation Age of Onset    Diabetes Father    [4]   Current Outpatient Medications   Medication Sig Dispense Refill    clotrimazole-betamethasone 1-0.05 % External Cream Apply 1 Application topically 2 (two) times daily. 60 g 3   [5] No Known Allergies

## 2025-08-11 ENCOUNTER — HOSPITAL ENCOUNTER (OUTPATIENT)
Dept: ULTRASOUND IMAGING | Facility: HOSPITAL | Age: 25
Discharge: HOME | End: 2025-08-11
Attending: NURSE PRACTITIONER

## 2025-08-11 DIAGNOSIS — N63.10 MASS OF RIGHT BREAST, UNSPECIFIED QUADRANT: ICD-10-CM

## 2025-08-11 PROCEDURE — 76642 ULTRASOUND BREAST LIMITED: CPT | Performed by: NURSE PRACTITIONER

## (undated) NOTE — LETTER
AUTHORIZATION FOR SURGICAL OPERATION OR OTHER PROCEDURE    1. I hereby authorize Kathia GONZALEZ , and [facility] staff assigned to my case to perform the following operation and/or procedure at the [facility] site:    IUD INSERTION _________________________________________________________________________      _______________________________________________________________________________________________    2.  My physician has explained the nature and purpose of the operation or other procedure, possible alternative methods of treatment, the risks involved, and the possibility of complication to me.  I acknowledge that no guarantee has been made as to the result that may be obtained.  3.  I recognize that, during the course of this operation, or other procedure, unforseen conditions may necessitate additional or different procedure than those listed above.  I, therefore, further authorize and request that the above named physician, his/her physician assistants or designees perform such procedures as are, in his/her professional opinion, necessary and desirable.  4.  Any tissue or organs removed in the operation or other procedure may be disposed of by and at the discretion of the [facility] and [facility].  5.  I understand that in the event of a medical emergency, I will be transported by local paramedics to [facility] or other hospital emergency department.  6.  I certify that I have read and fully understand the above consent to operation and/or other procedure.    7.  I acknowledge that my physician has explained sedation/analgesia administration to me including the risks and benefits.  I consent to the administration of sedation/analgesia as may be necessary or desirable in the judgement of my physician.    Witness signature: ___________________________________________________ Date:  ______/______/_____                    Time:  ________  A.M.  P.M.       Patient Name:  ______________________________________________________  (please print)      Patient signature:  ___________________________________________________             Relationship to Patient:           []  Parent    Responsible person                          []  Spouse  In case of minor or                    [] Other  _____________   Incompetent name:  __________________________________________________                               (please print)      _____________      Responsible person  In case of minor or  Incompetent signature:  _______________________________________________    Statement of Physician  My signature below affirms that prior to the time of the procedure, I have explained to the patient and/or his/her guardian, the risks and benefits involved in the proposed treatment and any reasonable alternative to the proposed treatment.  I have also explained the risks and benefits involved in the refusal of the proposed treatment and have answered the patient's questions.                        Date:  ______/______/_______  Provider                      Signature:  __________________________________________________________       Time:  ___________ A.M    P.M.

## (undated) NOTE — LETTER
AUTHORIZATION FOR SURGICAL OPERATION OR OTHER PROCEDURE    1. I hereby authorize Kathia Patton PA-C , and [facility] staff assigned to my case to perform the following operation and/or procedure at the [facility] site:  IUD insertion.    2.  My physician has explained the nature and purpose of the operation or other procedure, possible alternative methods of treatment, the risks involved, and the possibility of complication to me.  I acknowledge that no guarantee has been made as to the result that may be obtained.  3.  I recognize that, during the course of this operation, or other procedure, unforseen conditions may necessitate additional or different procedure than those listed above.  I, therefore, further authorize and request that the above named physician, his/her physician assistants or designees perform such procedures as are, in his/her professional opinion, necessary and desirable.  4.  Any tissue or organs removed in the operation or other procedure may be disposed of by and at the discretion of the [facility] and [facility].  5.  I understand that in the event of a medical emergency, I will be transported by local paramedics to [facility] or other hospital emergency department.  6.  I certify that I have read and fully understand the above consent to operation and/or other procedure.    7.  I acknowledge that my physician has explained sedation/analgesia administration to me including the risks and benefits.  I consent to the administration of sedation/analgesia as may be necessary or desirable in the judgement of my physician.    Witness signature: ___________________________________________________ Date:  ______/______/_____                    Time:  ________ A.M.  P.M.       Patient Name:  ______________________________________________________  (please print)      Patient signature:   ___________________________________________________             Relationship to Patient:           []  Parent    Responsible person                          []  Spouse  In case of minor or                    [] Other  _____________   Incompetent name:  __________________________________________________                               (please print)      _____________      Responsible person  In case of minor or  Incompetent signature:  _______________________________________________    Statement of Physician  My signature below affirms that prior to the time of the procedure, I have explained to the patient and/or his/her guardian, the risks and benefits involved in the proposed treatment and any reasonable alternative to the proposed treatment.  I have also explained the risks and benefits involved in the refusal of the proposed treatment and have answered the patient's questions.                        Date:  ______/______/_______  Provider                      Signature:  __________________________________________________________       Time:  ___________ A.M    P.M.